# Patient Record
Sex: FEMALE
[De-identification: names, ages, dates, MRNs, and addresses within clinical notes are randomized per-mention and may not be internally consistent; named-entity substitution may affect disease eponyms.]

---

## 2021-11-15 PROBLEM — Z00.00 ENCOUNTER FOR PREVENTIVE HEALTH EXAMINATION: Status: ACTIVE | Noted: 2021-11-15

## 2021-11-22 ENCOUNTER — APPOINTMENT (OUTPATIENT)
Dept: RHEUMATOLOGY | Facility: CLINIC | Age: 66
End: 2021-11-22
Payer: MEDICAID

## 2021-11-22 ENCOUNTER — LABORATORY RESULT (OUTPATIENT)
Age: 66
End: 2021-11-22

## 2021-11-22 VITALS
SYSTOLIC BLOOD PRESSURE: 131 MMHG | HEART RATE: 84 BPM | HEIGHT: 65 IN | BODY MASS INDEX: 20.83 KG/M2 | TEMPERATURE: 98 F | DIASTOLIC BLOOD PRESSURE: 67 MMHG | OXYGEN SATURATION: 97 % | WEIGHT: 125 LBS

## 2021-11-22 DIAGNOSIS — Z78.9 OTHER SPECIFIED HEALTH STATUS: ICD-10-CM

## 2021-11-22 DIAGNOSIS — Z11.59 ENCOUNTER FOR SCREENING FOR OTHER VIRAL DISEASES: ICD-10-CM

## 2021-11-22 DIAGNOSIS — R79.89 OTHER SPECIFIED ABNORMAL FINDINGS OF BLOOD CHEMISTRY: ICD-10-CM

## 2021-11-22 PROCEDURE — 99204 OFFICE O/P NEW MOD 45 MIN: CPT

## 2021-11-22 RX ORDER — METHYLPREDNISOLONE 2 MG/1
2 TABLET ORAL
Qty: 21 | Refills: 0 | Status: ACTIVE | COMMUNITY
Start: 2021-11-22

## 2021-11-22 NOTE — ASSESSMENT
[FreeTextEntry1] : 65 y/o F w RA\par =pain, swelling of wrists, MCPs, PIPs, shoulders, knees, ankles, toes=\par =swelling, and subluxation on exam\par =dry eyes\par =currently uncontrolled\par \par Do not need labs to know pt has RA. Discussed extensively of complications of untreated RA, including joint deformity, disability. Explained that untreated RA can lead to cardiovascular complications, including MI, CAD. Explained that RA can extend to other organs if left untreated, including lung, eyes. \par \par Pt currently not under control, and needs another medication. Will likely start on TNF inhibitor. \par \par Pt also reports hx of OP. 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 Discussed OP, and left untreated, can have fragility fx that leads to morbidity and mortality. Will obtain DEXA scan from PCP. \par \par Plan\par -Labs w serologies, inflammatory markers\par -Xrays cervical spine (extension/flexion), wrists/hands, feet/ankles \par RTO in 2 weeks to discuss dx and treatment plan\par

## 2021-11-22 NOTE — CONSULT LETTER
[Dear  ___] : Dear  [unfilled], [Consult Closing:] : Thank you very much for allowing me to participate in the care of this patient.  If you have any questions, please do not hesitate to contact me. [Sincerely,] : Sincerely, [FreeTextEntry2] :  Dr. Merly Doan\par [address], \[place] [address] [FreeTextEntry3] : Luis Carlos Galloway MD

## 2021-11-22 NOTE — HISTORY OF PRESENT ILLNESS
[FreeTextEntry1] : Referring physician: Dr. Merly Doan\par \par 67 y/o F here for consultation. \par Pt dx w RA6-7 years ago. \par Pt says that her symptoms are not currently controlled w medication she is taking. She is taking arava 20mg, medrol 4mg, and celecoxib. This is only medication that patient has tried. \par Pt has pain, swelling MCPs, PIPs, shoulders, hips, knees, ankles, feet.\par \par No fevers, h/a, rashes, hair loss, oral ulcers, epistaxis, sinusitis,  swollen glands, dry mouth, CP, SOB, cough, vision changes, abdominal pain, GERD, n/v/d, blood in stool or urine, focal weakness, sensory loss,  Raynaud's,  weight loss. \par +dry eyes\par \par OB: \par 1 pregnancy\par No miscarriages in past

## 2021-11-22 NOTE — PHYSICAL EXAM
[Strabismus] : no strabismus was seen [Auscultation Breath Sounds / Voice Sounds] : lungs were clear to auscultation bilaterally [Heart Sounds] : normal S1 and S2 [Heart Sounds Gallop] : no gallops [Murmurs] : no murmurs [Heart Sounds Pericardial Friction Rub] : no pericardial rub [Abdomen Tenderness] : non-tender [] : no hepato-splenomegaly [FreeTextEntry1] : R hand: 2nd MCP swelling, wrist, prominence heberden's nodes L hand; subluxation;  2nd-5th MCP swelling; R wrist prominence b/l knee swelling [Oriented To Time, Place, And Person] : oriented to person, place, and time

## 2021-12-03 ENCOUNTER — NON-APPOINTMENT (OUTPATIENT)
Age: 66
End: 2021-12-03

## 2021-12-03 ENCOUNTER — OUTPATIENT (OUTPATIENT)
Dept: OUTPATIENT SERVICES | Facility: HOSPITAL | Age: 66
LOS: 1 days | End: 2021-12-03
Payer: MEDICAID

## 2021-12-03 ENCOUNTER — APPOINTMENT (OUTPATIENT)
Dept: RADIOLOGY | Facility: HOSPITAL | Age: 66
End: 2021-12-03

## 2021-12-03 ENCOUNTER — APPOINTMENT (OUTPATIENT)
Dept: RHEUMATOLOGY | Facility: CLINIC | Age: 66
End: 2021-12-03
Payer: MEDICAID

## 2021-12-03 DIAGNOSIS — M35.00 SICCA SYNDROME, UNSPECIFIED: ICD-10-CM

## 2021-12-03 DIAGNOSIS — M06.9 RHEUMATOID ARTHRITIS, UNSPECIFIED: ICD-10-CM

## 2021-12-03 DIAGNOSIS — R79.89 OTHER SPECIFIED ABNORMAL FINDINGS OF BLOOD CHEMISTRY: ICD-10-CM

## 2021-12-03 DIAGNOSIS — Z79.899 OTHER LONG TERM (CURRENT) DRUG THERAPY: ICD-10-CM

## 2021-12-03 DIAGNOSIS — M32.9 SYSTEMIC LUPUS ERYTHEMATOSUS, UNSPECIFIED: ICD-10-CM

## 2021-12-03 LAB
25(OH)D3 SERPL-MCNC: 47.1 NG/ML
ALBUMIN SERPL ELPH-MCNC: 4 G/DL
ALP BLD-CCNC: 103 U/L
ALT SERPL-CCNC: 11 U/L
ANA PAT FLD IF-IMP: ABNORMAL
ANA SER IF-ACNC: ABNORMAL
ANION GAP SERPL CALC-SCNC: 14 MMOL/L
APPEARANCE: ABNORMAL
AST SERPL-CCNC: 16 U/L
B2 GLYCOPROT1 IGA SERPL IA-ACNC: <5 SAU
B2 GLYCOPROT1 IGG SER-ACNC: <5 SGU
B2 GLYCOPROT1 IGM SER-ACNC: <5 SMU
BACTERIA: NEGATIVE
BASOPHILS # BLD AUTO: 0.06 K/UL
BASOPHILS NFR BLD AUTO: 1.2 %
BILIRUB SERPL-MCNC: 0.3 MG/DL
BILIRUBIN URINE: NEGATIVE
BLOOD URINE: NEGATIVE
BUN SERPL-MCNC: 14 MG/DL
C3 SERPL-MCNC: 138 MG/DL
C4 SERPL-MCNC: 24 MG/DL
CALCIUM SERPL-MCNC: 9.2 MG/DL
CARDIOLIPIN IGM SER-MCNC: 11 MPL
CARDIOLIPIN IGM SER-MCNC: <5 GPL
CCP AB SER IA-ACNC: >250 UNITS
CHLORIDE SERPL-SCNC: 102 MMOL/L
CK SERPL-CCNC: 50 U/L
CO2 SERPL-SCNC: 22 MMOL/L
COLOR: YELLOW
CREAT SERPL-MCNC: 0.37 MG/DL
CREAT SPEC-SCNC: 154 MG/DL
CREAT/PROT UR: 0.2 RATIO
CRP SERPL-MCNC: 13 MG/L
DEPRECATED CARDIOLIPIN IGA SER: 10.1 APL
DSDNA AB SER-ACNC: 201 IU/ML
ENA RNP AB SER IA-ACNC: 1 AL
ENA SM AB SER IA-ACNC: <0.2 AL
ENA SS-A AB SER IA-ACNC: >8 AL
ENA SS-B AB SER IA-ACNC: 7.7 AL
EOSINOPHIL # BLD AUTO: 0.07 K/UL
EOSINOPHIL NFR BLD AUTO: 1.4 %
ERYTHROCYTE [SEDIMENTATION RATE] IN BLOOD BY WESTERGREN METHOD: 101 MM/HR
G6PD SER-CCNC: 10.2 U/G HGB
GLUCOSE QUALITATIVE U: NEGATIVE
GLUCOSE SERPL-MCNC: 120 MG/DL
HBV CORE IGG+IGM SER QL: NONREACTIVE
HBV SURFACE AB SER QL: REACTIVE
HBV SURFACE AG SER QL: NONREACTIVE
HCT VFR BLD CALC: 39.3 %
HCV AB SER QL: NONREACTIVE
HCV S/CO RATIO: 0.19 S/CO
HGB BLD-MCNC: 12.8 G/DL
HYALINE CASTS: 3 /LPF
IMM GRANULOCYTES NFR BLD AUTO: 0.2 %
KETONES URINE: NEGATIVE
LEUKOCYTE ESTERASE URINE: NEGATIVE
LYMPHOCYTES # BLD AUTO: 1.17 K/UL
LYMPHOCYTES NFR BLD AUTO: 23.9 %
M TB IFN-G BLD-IMP: NEGATIVE
MAN DIFF?: NORMAL
MCHC RBC-ENTMCNC: 29.2 PG
MCHC RBC-ENTMCNC: 32.6 GM/DL
MCV RBC AUTO: 89.5 FL
MICROSCOPIC-UA: NORMAL
MONOCYTES # BLD AUTO: 0.4 K/UL
MONOCYTES NFR BLD AUTO: 8.2 %
NEUTROPHILS # BLD AUTO: 3.19 K/UL
NEUTROPHILS NFR BLD AUTO: 65.1 %
NITRITE URINE: NEGATIVE
PH URINE: 5.5
PLATELET # BLD AUTO: 270 K/UL
POTASSIUM SERPL-SCNC: 3.8 MMOL/L
PROT SERPL-MCNC: 8.3 G/DL
PROT UR-MCNC: 23 MG/DL
PROTEIN URINE: NORMAL
QUANTIFERON TB PLUS MITOGEN MINUS NIL: 6.47 IU/ML
QUANTIFERON TB PLUS NIL: 0.03 IU/ML
QUANTIFERON TB PLUS TB1 MINUS NIL: 0.02 IU/ML
QUANTIFERON TB PLUS TB2 MINUS NIL: 0 IU/ML
RBC # BLD: 4.39 M/UL
RBC # FLD: 14.3 %
RED BLOOD CELLS URINE: 1 /HPF
RF+CCP IGG SER-IMP: ABNORMAL
RHEUMATOID FACT SER QL: 207 IU/ML
SODIUM SERPL-SCNC: 139 MMOL/L
SPECIFIC GRAVITY URINE: 1.03
SQUAMOUS EPITHELIAL CELLS: 1 /HPF
UROBILINOGEN URINE: NORMAL
WBC # FLD AUTO: 4.9 K/UL
WHITE BLOOD CELLS URINE: 2 /HPF

## 2021-12-03 PROCEDURE — 73610 X-RAY EXAM OF ANKLE: CPT | Mod: 26,50

## 2021-12-03 PROCEDURE — 99442: CPT

## 2021-12-03 PROCEDURE — 73110 X-RAY EXAM OF WRIST: CPT | Mod: 26,50

## 2021-12-03 PROCEDURE — 72040 X-RAY EXAM NECK SPINE 2-3 VW: CPT | Mod: 26

## 2021-12-03 PROCEDURE — 73620 X-RAY EXAM OF FOOT: CPT | Mod: 26,50

## 2021-12-03 PROCEDURE — 73120 X-RAY EXAM OF HAND: CPT | Mod: 26,50

## 2021-12-03 RX ORDER — FOLIC ACID 1 MG/1
1 TABLET ORAL DAILY
Qty: 90 | Refills: 0 | Status: ACTIVE | COMMUNITY
Start: 2021-12-03 | End: 1900-01-01

## 2021-12-03 RX ORDER — METHOTREXATE 2.5 MG/1
2.5 TABLET ORAL
Qty: 20 | Refills: 1 | Status: ACTIVE | COMMUNITY
Start: 2021-12-03 | End: 1900-01-01

## 2021-12-11 ENCOUNTER — NON-APPOINTMENT (OUTPATIENT)
Age: 66
End: 2021-12-11

## 2021-12-11 ENCOUNTER — APPOINTMENT (OUTPATIENT)
Dept: RHEUMATOLOGY | Facility: CLINIC | Age: 66
End: 2021-12-11

## 2021-12-22 ENCOUNTER — NON-APPOINTMENT (OUTPATIENT)
Age: 66
End: 2021-12-22

## 2022-01-10 ENCOUNTER — APPOINTMENT (OUTPATIENT)
Dept: RHEUMATOLOGY | Facility: CLINIC | Age: 67
End: 2022-01-10

## 2023-07-14 ENCOUNTER — NON-APPOINTMENT (OUTPATIENT)
Age: 68
End: 2023-07-14

## 2023-07-27 ENCOUNTER — APPOINTMENT (OUTPATIENT)
Dept: RHEUMATOLOGY | Facility: CLINIC | Age: 68
End: 2023-07-27